# Patient Record
Sex: MALE | Race: WHITE | NOT HISPANIC OR LATINO | URBAN - METROPOLITAN AREA
[De-identification: names, ages, dates, MRNs, and addresses within clinical notes are randomized per-mention and may not be internally consistent; named-entity substitution may affect disease eponyms.]

---

## 2019-12-17 ENCOUNTER — OFFICE VISIT (OUTPATIENT)
Dept: UROLOGY | Facility: CLINIC | Age: 39
End: 2019-12-17
Payer: COMMERCIAL

## 2019-12-17 VITALS
HEART RATE: 70 BPM | HEIGHT: 70 IN | DIASTOLIC BLOOD PRESSURE: 80 MMHG | SYSTOLIC BLOOD PRESSURE: 114 MMHG | WEIGHT: 175 LBS | BODY MASS INDEX: 25.05 KG/M2 | OXYGEN SATURATION: 98 %

## 2019-12-17 DIAGNOSIS — Z30.2 ENCOUNTER FOR STERILIZATION: Primary | ICD-10-CM

## 2019-12-17 PROCEDURE — 99203 OFFICE O/P NEW LOW 30 MIN: CPT | Performed by: UROLOGY

## 2019-12-17 ASSESSMENT — PAIN SCALES - GENERAL: PAINLEVEL: NO PAIN (0)

## 2019-12-17 ASSESSMENT — MIFFLIN-ST. JEOR: SCORE: 1715.04

## 2019-12-17 NOTE — PATIENT INSTRUCTIONS
"[facility] UROLOGY  Vasectomy Information  [phone]    Preoperative Instructions:    _____ You may have breakfast on the morning of your procedure.  If your procedure is          in the afternoon, you may have lunch as well.    _____ You must have someone drive you home after the procedure if you have been    prescribed an oral sedative (valium).                   _____ Do not take any aspirin, blood-thinning or anti-inflammatory medication for at    least 7-10 days before the procedure (this includes but is not limited to Advil,   Aleve, Ecotrin and Bufferin).    _____ Please shave your scrotum (see diagram) the morning of your procedure.  Use              Hibiclens (available at your local drugstore) antibacterial cleanser.        Postoperative Instructions Follow Vasectomy    Under routine circumstances, please note the following:    -No heavy lifting (over 15 lbs) for 48 hour.  -You may shower after 24 hours.  You may have a tub bath or use a swimming pool after one week postoperatively.  Your doctor will advise you if he feels it is helpful to soak in a bathtub postoperatively.  -Do not engage in intercourse for at least ten days and then proceed when comfortable.  -Wear an athletic supporter for 48 hours postoperatively or until any discomfort ceases.  -Your physician will instruct you regarding the use of ice in the recovery room or at home after the procedure, as necessary.  -Please remember as you resume your activity that you may experience some discomfor and/or swelling for the one to two weeks following the procedure.  If this occurs decrease activity and slightly elevate the scrotum (athletic supporter).  -As your stitches dissolve it may appear that the incision is \"gaping.\"  This is normal.    Necessary follow-up:  You do not need a follow up appointment unless you have problems after your procedure.  Please call our office at [phone] if you do.    At the time of your procedure you will be given " the supplies for your post procedure follow up.  The test should be done AT LEAST THREE MONTHS AFTER your vasecomy.  During this time, be certain to maintain birth control measure!    Between the time of your procedure and the time that you have your first sperm count it is very important that you have a minimum of 30 ejaculations.  If you have any questions about this, please consult your physician.    If the sperm count that is done at three months is negative, you will be considered sterile.  Until, you are told that you are free to discontinue birth control you are considered fertile.    If your sperm counts reveal the presence of sperm, you will be advised to repeat the test until a negative result is obtained.     NOTE:  Please call our office one week after dropping off your sample for the result.  Test results will only be given to the patient.

## 2019-12-17 NOTE — PROGRESS NOTES
VASECTOMY CONSULTATION NOTE  [facility]  [phone]  DATE OF VISIT: 12/17/2019    PATIENT NAME: Imer Hooks    YOB: 1980      REASON FOR CONSULTATION: Mr. Imer Hooks is a 39 year old year old gentleman who came to the urology clinic today requesting a vasectomy. He has 2 children and he wishes to have a vasectomy for birth control. He has no prior urologic history and has had no prior surgery on the testicles. He has no symptoms in the testicles.    PAST MEDICAL HISTORY:   Past Medical History:   Diagnosis Date     Hernia, abdominal     as a child       PAST SURGICAL HISTORY:   Past Surgical History:   Procedure Laterality Date     HERNIA REPAIR      as a child       MEDICATIONS:   Current Outpatient Medications:      NO ACTIVE MEDICATIONS, , Disp: , Rfl:     ALLERGIES: No Known Allergies    FAMILY HISTORY: No family history on file.    SOCIAL HISTORY:   Social History     Socioeconomic History     Marital status: Single     Spouse name: Not on file     Number of children: Not on file     Years of education: Not on file     Highest education level: Not on file   Occupational History     Not on file   Social Needs     Financial resource strain: Not on file     Food insecurity:     Worry: Not on file     Inability: Not on file     Transportation needs:     Medical: Not on file     Non-medical: Not on file   Tobacco Use     Smoking status: Never Smoker     Smokeless tobacco: Never Used   Substance and Sexual Activity     Alcohol use: Not Currently     Drug use: Not Currently     Sexual activity: Yes   Lifestyle     Physical activity:     Days per week: Not on file     Minutes per session: Not on file     Stress: Not on file   Relationships     Social connections:     Talks on phone: Not on file     Gets together: Not on file     Attends Mandaeism service: Not on file     Active member of club or organization: Not on file     Attends meetings of clubs or organizations: Not on file  "    Relationship status: Not on file     Intimate partner violence:     Fear of current or ex partner: Not on file     Emotionally abused: Not on file     Physically abused: Not on file     Forced sexual activity: Not on file   Other Topics Concern     Parent/sibling w/ CABG, MI or angioplasty before 65F 55M? Not Asked   Social History Narrative     Not on file       REVIEW OF SYSTEMS:  Skin: No rash, pruritis, or skin pigmentation  Eyes: No changes in vision  Ears/Nose/Throat: No changes in hearing, no nosebleeds  Respiratory: No shortness of breath, dyspnea on exertion, cough, or hemoptysis  Cardiovascular: No chest pain or palpitations  Gastrointestinal: No diarrhea or constipation. No abdominal pain. No hematochezia  Genitourinary: see HPI  Musculoskeletal: No pain or swelling of joints, normal range of motion  Neurologic: No weakness or tremors  Psychiatric: No recent changes in memory or mood  Hematologic/Lymphatic/Immunologic: No easy bruising or enlarged lymph nodes  Endocrine: No weight gain or loss      HEIGHT: 5' 10\"     WEIGHT: 175 lbs 0 oz   BP: 114/80    PULSE: 70    EXAM: He is alert and oriented and well-appearing.  Examination of the scrotum reveals normal scrotal skin.  The testicles are normal to palpation bilaterally with no intratesticular lesions.  He has normally palpable vasa bilaterally.    DIAGNOSIS: Request for sterilization    PLAN: The risks of the procedure as well as expectations for recovery and outcomes were splint in detail to him.  He was counseled on the risks for bleeding infection and pain after the procedure.  He was instructed to continue to use contraception until he had proven azoospermia on a semen specimen.  This would normally be collected at least 3 months after the procedure.  He was instructed to hold all anticoagulants medications for one week prior to the procedure.  He was also instructed to shave the scrotum prior to procedure.  It was recommended that he have " someone else drive him home after his vasectomy.  In light of these risks and expectations he would like to proceed.  We are scheduling a vasectomy in the office in the near future.    Massimo Gastelum M.D.

## 2019-12-17 NOTE — LETTER
12/17/2019       RE: Imer Hooks  [address] [place] [address]     Dear Colleague,    Thank you for referring your patient, Imer Hooks, to the [facility] UROLOGY CLINIC [place] at [facility]. Please see a copy of my visit note below.    VASECTOMY CONSULTATION NOTE  [facility]rology [facility]  [phone]  DATE OF VISIT: 12/17/2019    PATIENT NAME: Imer Hooks    YOB: 1980      REASON FOR CONSULTATION: Mr. Imer Hooks is a 39 year old year old gentleman who came to the urology clinic today requesting a vasectomy. He has 2 children and he wishes to have a vasectomy for birth control. He has no prior urologic history and has had no prior surgery on the testicles. He has no symptoms in the testicles.    PAST MEDICAL HISTORY:   Past Medical History:   Diagnosis Date     Hernia, abdominal     as a child       PAST SURGICAL HISTORY:   Past Surgical History:   Procedure Laterality Date     HERNIA REPAIR      as a child       MEDICATIONS:   Current Outpatient Medications:      NO ACTIVE MEDICATIONS, , Disp: , Rfl:     ALLERGIES: No Known Allergies    FAMILY HISTORY: No family history on file.    SOCIAL HISTORY:   Social History     Socioeconomic History     Marital status: Single     Spouse name: Not on file     Number of children: Not on file     Years of education: Not on file     Highest education level: Not on file   Occupational History     Not on file   Social Needs     Financial resource strain: Not on file     Food insecurity:     Worry: Not on file     Inability: Not on file     Transportation needs:     Medical: Not on file     Non-medical: Not on file   Tobacco Use     Smoking status: Never Smoker     Smokeless tobacco: Never Used   Substance and Sexual Activity     Alcohol use: Not Currently     Drug use: Not Currently     Sexual activity: Yes   Lifestyle     Physical activity:     Days per week: Not on file     Minutes per  "session: Not on file     Stress: Not on file   Relationships     Social connections:     Talks on phone: Not on file     Gets together: Not on file     Attends Mandaeism service: Not on file     Active member of club or organization: Not on file     Attends meetings of clubs or organizations: Not on file     Relationship status: Not on file     Intimate partner violence:     Fear of current or ex partner: Not on file     Emotionally abused: Not on file     Physically abused: Not on file     Forced sexual activity: Not on file   Other Topics Concern     Parent/sibling w/ CABG, MI or angioplasty before 65F 55M? Not Asked   Social History Narrative     Not on file       REVIEW OF SYSTEMS:  Skin: No rash, pruritis, or skin pigmentation  Eyes: No changes in vision  Ears/Nose/Throat: No changes in hearing, no nosebleeds  Respiratory: No shortness of breath, dyspnea on exertion, cough, or hemoptysis  Cardiovascular: No chest pain or palpitations  Gastrointestinal: No diarrhea or constipation. No abdominal pain. No hematochezia  Genitourinary: see HPI  Musculoskeletal: No pain or swelling of joints, normal range of motion  Neurologic: No weakness or tremors  Psychiatric: No recent changes in memory or mood  Hematologic/Lymphatic/Immunologic: No easy bruising or enlarged lymph nodes  Endocrine: No weight gain or loss      HEIGHT: 5' 10\"     WEIGHT: 175 lbs 0 oz   BP: 114/80    PULSE: 70    EXAM: He is alert and oriented and well-appearing.  Examination of the scrotum reveals normal scrotal skin.  The testicles are normal to palpation bilaterally with no intratesticular lesions.  He has normally palpable vasa bilaterally.    DIAGNOSIS: Request for sterilization    PLAN: The risks of the procedure as well as expectations for recovery and outcomes were splint in detail to him.  He was counseled on the risks for bleeding infection and pain after the procedure.  He was instructed to continue to use contraception until he had " proven azoospermia on a semen specimen.  This would normally be collected at least 3 months after the procedure.  He was instructed to hold all anticoagulants medications for one week prior to the procedure.  He was also instructed to shave the scrotum prior to procedure.  It was recommended that he have someone else drive him home after his vasectomy.  In light of these risks and expectations he would like to proceed.  We are scheduling a vasectomy in the office in the near future.    Massimo Gastelum M.D.      Again, thank you for allowing me to participate in the care of your patient.      Sincerely,    Massimo Gastelum MD

## 2020-01-07 ENCOUNTER — OFFICE VISIT (OUTPATIENT)
Dept: UROLOGY | Facility: CLINIC | Age: 40
End: 2020-01-07
Payer: COMMERCIAL

## 2020-01-07 VITALS
SYSTOLIC BLOOD PRESSURE: 124 MMHG | BODY MASS INDEX: 25.05 KG/M2 | OXYGEN SATURATION: 99 % | WEIGHT: 175 LBS | HEART RATE: 59 BPM | DIASTOLIC BLOOD PRESSURE: 90 MMHG | HEIGHT: 70 IN

## 2020-01-07 DIAGNOSIS — Z30.2 ENCOUNTER FOR STERILIZATION: Primary | ICD-10-CM

## 2020-01-07 PROCEDURE — 88302 TISSUE EXAM BY PATHOLOGIST: CPT | Performed by: UROLOGY

## 2020-01-07 PROCEDURE — 55250 REMOVAL OF SPERM DUCT(S): CPT | Performed by: UROLOGY

## 2020-01-07 RX ORDER — LIDOCAINE HYDROCHLORIDE 20 MG/ML
20 INJECTION, SOLUTION INFILTRATION; PERINEURAL ONCE
Status: COMPLETED | OUTPATIENT
Start: 2020-01-07 | End: 2020-01-07

## 2020-01-07 RX ADMIN — LIDOCAINE HYDROCHLORIDE 20 ML: 20 INJECTION, SOLUTION INFILTRATION; PERINEURAL at 15:00

## 2020-01-07 ASSESSMENT — MIFFLIN-ST. JEOR: SCORE: 1715.04

## 2020-01-07 ASSESSMENT — PAIN SCALES - GENERAL
PAINLEVEL: NO PAIN (0)
PAINLEVEL: NO PAIN (0)

## 2020-01-07 NOTE — NURSING NOTE
Chief Complaint   Patient presents with     Sterilization     Pt here for in office vasectomy     Patient has signed the consent form stating that we will be doing a bilateral vasectomy today and that this is the correct procedure.  I verbally confirmed the patient's identity using two indicators, relevant allergies, and that the correct equipment was available. Patient was cleaned and prepped according to the appropriate policy.  Equipment was prepped in a sterile fashion and MD was informed that patient was ready.    Consent read and signed: Yes  Aspirin/blood thinning products stopped 7 days prior to procedure: No, 600mg ibuprofen taken 3 days ago  No Known Allergies    Physician performed procedure.  After the procedure the patient was instructed to wait approximately three months and at least 30 ejaculations prior to returning a sample to confirm sterility.  The patient was instructed to bring in sample within 3-6 hours post sample ejaculation.  Any additional medications after the procedure were sent to the patient's pharmacy and instructions were given according to company protocol and the performing physician.  The physician performing the procedure prescribed as they felt appropriate.  Patient was also instructed to avoid heavy lifting or strenuous activity for 10-14 days and to ice the scrotum.  Samples from the R and L vas deferens were sent to the lab and an order was placed for future semen analysis.   The following medication was given:     MEDICATION:  Lidocaine 2% Soln  ROUTE: vas deferens  SITE: vas deferens  DOSE: 20mL  LOT #: [id number]  : [employer]  EXPIRATION DATE: 04/23  NDC#: 92728-967-61   Was there drug waste? No  Multi-dose vial: Yes    Yue Renteria CMA  January 7, 2020   Yue Renteria CMA

## 2020-01-07 NOTE — LETTER
1/7/2020       RE: Imer Hooks  [address] [place] [address]     Dear Colleague,    Thank you for referring your patient, Imer Hooks, to the [facility] UROLOGY CLINIC [place] at [facility]. Please see a copy of my visit note below.    OFFICE VASECTOMY OPERATIVE NOTE  [facility] Urology [facility]  [phone]    DATE: 01/07/20  PATIENT: Imer Hooks    YOB: 1980    Imer Hooks is a 39 year old male.  He has 2 children and he wishes a vasectomy for birth control.  He has read the brochure and he has shaved himself.  I reviewed the vasectomy procedure with him explaining that it would be done with a local anesthetic given just in the location where the vasectomy would be done.  It would be done through scalpel-less incisions with the removal of segments of the vasa, cauterization of the ends, and burying the ends separate with sutures.      Pt. Understands:  1/1000-1/3000 risk of future pregnancy even with perfectly done vasectomy  -vasectomy is a permanent procedure    -he may cryopreserve sperm if he wishes   -1-5% risk of post-vasectomy pain syndrome   -1-5% risk of complication, primarily infection or bleeding  - he needs to have a semen sample that shows no sperm before getting approval for unprotected intercourse.      Complications such as bleeding, infection, and damage to other tissues in the area were discussed.  I recommended that an ice bag be placed on the scrotum off and on tonight to help reduce pain and swelling.      He was reminded that he was not sterile immediately after the vasectomy that it would take at least 20 ejaculations to empty the vas of any remaining sperm.  He was not to provide a semen sample until after the 20th ejaculation and not before 12 weeks after the vas. He was  to fulfill both of those requirements.   He understands it is his responsibility to find out the results of the vas before  proceeding with intercourse without birth control protection.  Other items discussed were activity afterwards, returning to work, voluntary physical activity,  resuming sexual activity, clothing to wear, bathing, and care of the vas site and expected changes in the site as healing progresses.  After signing the permit, bilateral vasectomy was done as described through scalpel-less incisions.       ANESTHESIA: Local    DETAILS OF PROCEDURE: The risks of the procedure were explained in detail to the patient and informed consent was obtained. The patient was placed supine on the procedure table and the penis and scrotum were prepped and draped in the standard sterile fashion. The right vas deferens was isolated and brought up to the median raphe of the scrotum. 1% lidocaine local anesthesia was used to infiltrate the skin and the spermatic cord. A sharp hemostat was used to make a skin puncture. Adventitial tissues were swept away from the vas. A 1 cm segment of the vas was excised and sent for pathology. The proximal and distal lumina of the vas were cauterized and then each segment was tied off in a knuckling-fashion with a 3-0 chromic suture. Hemostasis was ensured and the segments were released back into the scrotum. Next the left vas was brought up to the same incision and a vasectomy was performed in the similar fashion. At the end of the procedure a single 3-0 chromic suture was placed in the skin.     COMPLICATIONS: None    DISMISSAL INSTRUCTIONS:  - Ice pack to scrotum 15 to 20 minutes each hour awake for 36 to 40 hours.  - No strenuous activity or ejaculation for 14 days.  - No unprotected sexual activity until proven azoospermia on semen samples at 3 months.  - Referred to patient handout for normal postop expectations and indications to contact nurse or physician.    MKathrynD.: Massimo Gastelum M.D.       Massimo Gastelum MD

## 2020-01-07 NOTE — PROGRESS NOTES
OFFICE VASECTOMY OPERATIVE NOTE  [facility] Urology [facility]  [phone]    DATE: 01/07/20  PATIENT: Imer Hooks    YOB: 1980    Imer Hooks is a 39 year old male.  He has 2 children and he wishes a vasectomy for birth control.  He has read the brochure and he has shaved himself.  I reviewed the vasectomy procedure with him explaining that it would be done with a local anesthetic given just in the location where the vasectomy would be done.  It would be done through scalpel-less incisions with the removal of segments of the vasa, cauterization of the ends, and burying the ends separate with sutures.      Pt. Understands:  1/1000-1/3000 risk of future pregnancy even with perfectly done vasectomy  -vasectomy is a permanent procedure    -he may cryopreserve sperm if he wishes   -1-5% risk of post-vasectomy pain syndrome   -1-5% risk of complication, primarily infection or bleeding  - he needs to have a semen sample that shows no sperm before getting approval for unprotected intercourse.      Complications such as bleeding, infection, and damage to other tissues in the area were discussed.  I recommended that an ice bag be placed on the scrotum off and on tonight to help reduce pain and swelling.      He was reminded that he was not sterile immediately after the vasectomy that it would take at least 20 ejaculations to empty the vas of any remaining sperm.  He was not to provide a semen sample until after the 20th ejaculation and not before 12 weeks after the vas. He was  to fulfill both of those requirements.   He understands it is his responsibility to find out the results of the vas before proceeding with intercourse without birth control protection.  Other items discussed were activity afterwards, returning to work, voluntary physical activity,  resuming sexual activity, clothing to wear, bathing, and care of the vas site and expected changes in the site as healing progresses.  After signing the  permit, bilateral vasectomy was done as described through scalpel-less incisions.       ANESTHESIA: Local    DETAILS OF PROCEDURE: The risks of the procedure were explained in detail to the patient and informed consent was obtained. The patient was placed supine on the procedure table and the penis and scrotum were prepped and draped in the standard sterile fashion. The right vas deferens was isolated and brought up to the median raphe of the scrotum. 1% lidocaine local anesthesia was used to infiltrate the skin and the spermatic cord. A sharp hemostat was used to make a skin puncture. Adventitial tissues were swept away from the vas. A 1 cm segment of the vas was excised and sent for pathology. The proximal and distal lumina of the vas were cauterized and then each segment was tied off in a knuckling-fashion with a 3-0 chromic suture. Hemostasis was ensured and the segments were released back into the scrotum. Next the left vas was brought up to the same incision and a vasectomy was performed in the similar fashion. At the end of the procedure a single 3-0 chromic suture was placed in the skin.     COMPLICATIONS: None    DISMISSAL INSTRUCTIONS:  - Ice pack to scrotum 15 to 20 minutes each hour awake for 36 to 40 hours.  - No strenuous activity or ejaculation for 14 days.  - No unprotected sexual activity until proven azoospermia on semen samples at 3 months.  - Referred to patient handout for normal postop expectations and indications to contact nurse or physician.    M.D.: Massimo Gastelum M.D.

## 2020-01-07 NOTE — PATIENT INSTRUCTIONS
POST VASECTOMY INSTRUCTIONS    1.) If you have any concerns or questions, please contact our office at [phone].     2.) It is okay to take a shower, however, do not soak in water (bath,swimming, hot tub,etc....) until your incision is healed.    3.) You might notice some swelling, mild bruising, and discomfort for several days after your vasectomy. This is to be expected. For at least the next 24 hours, an ice pack should be applied for 20 minutes every hour that you are awake. Ice will help with discomfort and swelling. Do not place directly on the skin.    4.) No intercourse, strenuous activity or exercise for at least 7-10 days, even if you feel fine.    5.) You need to wear good scrotal support while you are healing. We strongly recommend an athletic supporter or a pair of regular briefs that are one size too small. Boxer briefs do not offer enough support.    6.) Tylenol as directed on the bottle is preferred for discomfort. Please avoid any blood thinning products such as ibuprofen and aspirin (Motrin, Advil, Excedrin, Aleve, ect..) for at least the next week.    7.) It is normal to have mild drainage from the incision area for several days. However, please contact our office if you notice: bright red blood that does not stop after three days, increased pain, heat at the incision, red streaks, foul smelling discharge, or if you start to run a fever.     8.) YOU MUST CONTINUE BIRTH CONTROL UNTIL WE CONFIRM YOUR STERILITY.  This process can take up to a year to complete (rare occurrence).     9.) You have been given a form with specimen cup and instructions for your follow up specimen. You will be cleared once we receive ONE negative specimen. If your specimen comes back positive (sperm seen) you will be asked to repeat the test. This does not mean that your vasectomy has failed.

## 2020-01-09 LAB — COPATH REPORT: NORMAL

## 2020-06-11 DIAGNOSIS — Z30.2 ENCOUNTER FOR STERILIZATION: ICD-10-CM

## 2020-06-11 LAB — SEMEN ANALYSIS P VAS PNL: NORMAL

## 2020-06-11 PROCEDURE — 89321 SEMEN ANAL SPERM DETECTION: CPT | Performed by: UROLOGY

## 2021-05-04 ENCOUNTER — IMMUNIZATION (OUTPATIENT)
Dept: NURSING | Facility: CLINIC | Age: 41
End: 2021-05-04
Payer: COMMERCIAL

## 2021-05-04 PROCEDURE — 0001A PR COVID VAC PFIZER DIL RECON 30 MCG/0.3 ML IM: CPT

## 2021-05-04 PROCEDURE — 91300 PR COVID VAC PFIZER DIL RECON 30 MCG/0.3 ML IM: CPT

## 2021-05-25 ENCOUNTER — IMMUNIZATION (OUTPATIENT)
Dept: NURSING | Facility: CLINIC | Age: 41
End: 2021-05-25
Attending: INTERNAL MEDICINE
Payer: COMMERCIAL

## 2021-05-25 PROCEDURE — 91300 PR COVID VAC PFIZER DIL RECON 30 MCG/0.3 ML IM: CPT

## 2021-05-25 PROCEDURE — 0002A PR COVID VAC PFIZER DIL RECON 30 MCG/0.3 ML IM: CPT
